# Patient Record
(demographics unavailable — no encounter records)

---

## 2025-02-09 NOTE — HISTORY OF PRESENT ILLNESS
[de-identified] : CC: CRS   HISTORY OF PRESENT ILLNESS: RAHUL FELTON is a 24 year female who presents today with nasal congestion x many years she had an SRP in 2020 following a nasal injury since then she has had persistent nasal congestion that is affecting her QOL, although at baseline she's had allergy symptoms she has been seen by various ENTs and allergists. she has allergies to pollen, animal dander, cockroaches. she takes claritin/aleve periodically. she is using xhance, but she has not tried the netipot. xhance has not improved her symptoms. she had a CT sinuses, but does not recall where she had it done she does endorse bruxism and headaches   Environmental Allergies: yes Immunotherapy: no Smoker: she smokes marijuana daily Asthma: no ASA sensitivity: no History of Autoimmune Disease: no History of Immune Deficiency: no   Key Elements at least 4 described: Location: BL Severity: severe Quality: congestion  Timing: constant  Duration: 4+ years Modifying Factors: AH Associated signs and symptoms: see above   REVIEW OF SYSTEMS: General ROS: negative for - chills, fatigue or fever Psychological ROS: negative for - anxiety or depression Ophthalmic ROS: negative for - blurry vision, decreased vision or double vision ENT ROS: negative except as noted from HPI Allergy and Immunology ROS: negative except as noted from HPI Hematological and Lymphatic ROS: negative for - bleeding problems Endocrine ROS: negative for - malaise/lethargy Respiratory ROS: negative for - stridor Cardiovascular ROS: negative for - chest pain Gastrointestinal ROS: negative for - appetite loss or nausea/vomiting Genitourinary ROS: negative for - incontinence Musculoskeletal ROS: negative for - gait disturbance Neurological ROS: negative for - behavioral changes Dermatological ROS: negative for - nail changes   Physical Exam:   GENERAL APPEARANCE: Well-developed and No Acute Distress. COMMUNICATION: Able to Communicate. Strong Voice.   HEAD AND FACE Eyes: Testing of ocular motility including primary gaze alignment normal. Inspection and Appearance: No evidence of lesions or masses Palpation: Palpation of the face reveals no sinus tenderness Salivary Glands: Symmetric without masses Facial Strength: Symmetric without evidence of facial paralysis   EAR, NOSE, MOUTH, and THROAT: Ear Canals and Tympanic Membranes, Bilateral: No evidence of inflammation or lesions. Thresholds: Clinical speech reception thresholds normal. External, Nose and Auricle: No lesions or masses. Nasal, Mucosa, Septum, and Turbinates: See endoscopy.   NECK: Evaluation: No evidence of masses or crepitus. The neck is symmetric and the trachea is in the midline position. Thyroid: No evidence of enlargement, tenderness or mass. Neck Lymph Nodes: WNL. Respiratory: Inspection of the chest including symmetry, expansion and/or assessment of respiratory effort normal. Cardiovascular: Evaluation of peripheral vascular system by observation and palpation of capillary refill, normal. Neurological/Psychiatric: Alert, Oriented, Mood, and Affect Normal.    PROCEDURE: Removal impacted cerumen requiring instrumentation. (96182)   PREOPERATIVE DIAGNOSIS: Cerumen Impaction   POSTOPERATIVE DIAGNOSIS Dx: Same   INDICATION FOR PROCEDURE: Patient has noted fullness and hearing loss in the affected ears for significant period of time.   EXAM FINDINGS: Auditory canal(s) was obstructed with cerumen. Cerumen was observed with the microscope after the ear speculum was placed in the EAC, and gently loosened with the cerumen loop circumferentially. The cerumen was then removed using suction, cerumen loop, and irrigation. The tympanic membranes are intact following the procedure. Auditory canals appear minimally inflamed.   Left ear: CI removed, TMI Right ear: CI removed, TMI     IMAGING:   PROCEDURE: Nasal endoscopy (39910)   SURGEON: Tj Harley MD   Prior to the procedure, I had a discussion with the patient regarding the risks, benefits, and alternatives of the procedure and a verbal consent was obtained.   After obtaining adequate decongestion of the nasal mucosa with topical Epinephrine and adequate anesthesia with topical Lidocaine nasal spray, the nasal endoscope was used to examine the nasal passages and paranasal sinuses. The endoscope was passed along the floor of the nose bilaterally to evaluate the inferior meatus, floor of the nose, inferior turbinate, and nasopharynx. The scope was then passed superiorly to evaluate the area of the sphenoethmoidal recess, superior turbinate and superior meatus. As the scope was withdrawn anteriorly, the middle turbinate and middle meatus were carefully inspected. The endoscope was withdrawn and the patient tolerated the procedure well. No complications were encountered.   INSTRUMENTS: pediatric 30   EXAM FINDINGS: septum still deviated to the left, unable to visualize the middle meatus, BITH, small polyp on R side. no mucopurlence    IMPRESSION: RAHUL FELTON is a 24 year female who presents today with allergic rhinitis, DNS CRSwNP s/p SRP in 2020   PLAN: - start pred40 taper, prescribed - start NSI w/ budesonide BID, prescribed - she will locate her CT scan and bring it in for evaluation - encouraged use of  for bruxism - RTC in 6 weeks   Tj Harley MD [facility] Rhinology and Skull Base Surgery Department of Otolaryngology- Head and Neck Surgery [facility]

## 2025-02-09 NOTE — HISTORY OF PRESENT ILLNESS
[de-identified] : CC: CRS   HISTORY OF PRESENT ILLNESS: RAHUL FELTON is a 24 year female who presents today with nasal congestion x many years she had an SRP in 2020 following a nasal injury since then she has had persistent nasal congestion that is affecting her QOL, although at baseline she's had allergy symptoms she has been seen by various ENTs and allergists. she has allergies to pollen, animal dander, cockroaches. she takes claritin/aleve periodically. she is using xhance, but she has not tried the netipot. xhance has not improved her symptoms. she had a CT sinuses, but does not recall where she had it done she does endorse bruxism and headaches   Environmental Allergies: yes Immunotherapy: no Smoker: she smokes marijuana daily Asthma: no ASA sensitivity: no History of Autoimmune Disease: no History of Immune Deficiency: no   Key Elements at least 4 described: Location: BL Severity: severe Quality: congestion  Timing: constant  Duration: 4+ years Modifying Factors: AH Associated signs and symptoms: see above   REVIEW OF SYSTEMS: General ROS: negative for - chills, fatigue or fever Psychological ROS: negative for - anxiety or depression Ophthalmic ROS: negative for - blurry vision, decreased vision or double vision ENT ROS: negative except as noted from HPI Allergy and Immunology ROS: negative except as noted from HPI Hematological and Lymphatic ROS: negative for - bleeding problems Endocrine ROS: negative for - malaise/lethargy Respiratory ROS: negative for - stridor Cardiovascular ROS: negative for - chest pain Gastrointestinal ROS: negative for - appetite loss or nausea/vomiting Genitourinary ROS: negative for - incontinence Musculoskeletal ROS: negative for - gait disturbance Neurological ROS: negative for - behavioral changes Dermatological ROS: negative for - nail changes   Physical Exam:   GENERAL APPEARANCE: Well-developed and No Acute Distress. COMMUNICATION: Able to Communicate. Strong Voice.   HEAD AND FACE Eyes: Testing of ocular motility including primary gaze alignment normal. Inspection and Appearance: No evidence of lesions or masses Palpation: Palpation of the face reveals no sinus tenderness Salivary Glands: Symmetric without masses Facial Strength: Symmetric without evidence of facial paralysis   EAR, NOSE, MOUTH, and THROAT: Ear Canals and Tympanic Membranes, Bilateral: No evidence of inflammation or lesions. Thresholds: Clinical speech reception thresholds normal. External, Nose and Auricle: No lesions or masses. Nasal, Mucosa, Septum, and Turbinates: See endoscopy.   NECK: Evaluation: No evidence of masses or crepitus. The neck is symmetric and the trachea is in the midline position. Thyroid: No evidence of enlargement, tenderness or mass. Neck Lymph Nodes: WNL. Respiratory: Inspection of the chest including symmetry, expansion and/or assessment of respiratory effort normal. Cardiovascular: Evaluation of peripheral vascular system by observation and palpation of capillary refill, normal. Neurological/Psychiatric: Alert, Oriented, Mood, and Affect Normal.    PROCEDURE: Removal impacted cerumen requiring instrumentation. ([id number])   PREOPERATIVE DIAGNOSIS: Cerumen Impaction   POSTOPERATIVE DIAGNOSIS Dx: Same   INDICATION FOR PROCEDURE: Patient has noted fullness and hearing loss in the affected ears for significant period of time.   EXAM FINDINGS: Auditory canal(s) was obstructed with cerumen. Cerumen was observed with the microscope after the ear speculum was placed in the EAC, and gently loosened with the cerumen loop circumferentially. The cerumen was then removed using suction, cerumen loop, and irrigation. The tympanic membranes are intact following the procedure. Auditory canals appear minimally inflamed.   Left ear: CI removed, TMI Right ear: CI removed, TMI     IMAGING:   PROCEDURE: Nasal endoscopy ([id number])   SURGEON: jT Harley MD   Prior to the procedure, I had a discussion with the patient regarding the risks, benefits, and alternatives of the procedure and a verbal consent was obtained.   After obtaining adequate decongestion of the nasal mucosa with topical Epinephrine and adequate anesthesia with topical Lidocaine nasal spray, the nasal endoscope was used to examine the nasal passages and paranasal sinuses. The endoscope was passed along the floor of the nose bilaterally to evaluate the inferior meatus, floor of the nose, inferior turbinate, and nasopharynx. The scope was then passed superiorly to evaluate the area of the sphenoethmoidal recess, superior turbinate and superior meatus. As the scope was withdrawn anteriorly, the middle turbinate and middle meatus were carefully inspected. The endoscope was withdrawn and the patient tolerated the procedure well. No complications were encountered.   INSTRUMENTS: pediatric 30   EXAM FINDINGS: septum still deviated to the left, unable to visualize the middle meatus, BITH, small polyp on R side. no mucopurlence    IMPRESSION: RAHUL FELTON is a 24 year female who presents today with allergic rhinitis, DNS CRSwNP s/p SRP in 2020   PLAN: - start pred40 taper, prescribed - start NSI w/ budesonide BID, prescribed - she will locate her CT scan and bring it in for evaluation - encouraged use of  for bruxism - RTC in 6 weeks   Tj Harley MD [facility] Rhinology and Skull Base Surgery Department of Otolaryngology- Head and Neck Surgery [facility]

## 2025-02-09 NOTE — HISTORY OF PRESENT ILLNESS
[de-identified] : CC: CRS   HISTORY OF PRESENT ILLNESS: RAHUL FELTON is a 24 year female who presents today with nasal congestion x many years she had an SRP in 2020 following a nasal injury since then she has had persistent nasal congestion that is affecting her QOL, although at baseline she's had allergy symptoms she has been seen by various ENTs and allergists. she has allergies to pollen, animal dander, cockroaches. she takes claritin/aleve periodically. she is using xhance, but she has not tried the netipot. xhance has not improved her symptoms. she had a CT sinuses, but does not recall where she had it done she does endorse bruxism and headaches   Environmental Allergies: yes Immunotherapy: no Smoker: she smokes marijuana daily Asthma: no ASA sensitivity: no History of Autoimmune Disease: no History of Immune Deficiency: no   Key Elements at least 4 described: Location: BL Severity: severe Quality: congestion  Timing: constant  Duration: 4+ years Modifying Factors: AH Associated signs and symptoms: see above   REVIEW OF SYSTEMS: General ROS: negative for - chills, fatigue or fever Psychological ROS: negative for - anxiety or depression Ophthalmic ROS: negative for - blurry vision, decreased vision or double vision ENT ROS: negative except as noted from HPI Allergy and Immunology ROS: negative except as noted from HPI Hematological and Lymphatic ROS: negative for - bleeding problems Endocrine ROS: negative for - malaise/lethargy Respiratory ROS: negative for - stridor Cardiovascular ROS: negative for - chest pain Gastrointestinal ROS: negative for - appetite loss or nausea/vomiting Genitourinary ROS: negative for - incontinence Musculoskeletal ROS: negative for - gait disturbance Neurological ROS: negative for - behavioral changes Dermatological ROS: negative for - nail changes   Physical Exam:   GENERAL APPEARANCE: Well-developed and No Acute Distress. COMMUNICATION: Able to Communicate. Strong Voice.   HEAD AND FACE Eyes: Testing of ocular motility including primary gaze alignment normal. Inspection and Appearance: No evidence of lesions or masses Palpation: Palpation of the face reveals no sinus tenderness Salivary Glands: Symmetric without masses Facial Strength: Symmetric without evidence of facial paralysis   EAR, NOSE, MOUTH, and THROAT: Ear Canals and Tympanic Membranes, Bilateral: No evidence of inflammation or lesions. Thresholds: Clinical speech reception thresholds normal. External, Nose and Auricle: No lesions or masses. Nasal, Mucosa, Septum, and Turbinates: See endoscopy.   NECK: Evaluation: No evidence of masses or crepitus. The neck is symmetric and the trachea is in the midline position. Thyroid: No evidence of enlargement, tenderness or mass. Neck Lymph Nodes: WNL. Respiratory: Inspection of the chest including symmetry, expansion and/or assessment of respiratory effort normal. Cardiovascular: Evaluation of peripheral vascular system by observation and palpation of capillary refill, normal. Neurological/Psychiatric: Alert, Oriented, Mood, and Affect Normal.    PROCEDURE: Removal impacted cerumen requiring instrumentation. ([id number])   PREOPERATIVE DIAGNOSIS: Cerumen Impaction   POSTOPERATIVE DIAGNOSIS Dx: Same   INDICATION FOR PROCEDURE: Patient has noted fullness and hearing loss in the affected ears for significant period of time.   EXAM FINDINGS: Auditory canal(s) was obstructed with cerumen. Cerumen was observed with the microscope after the ear speculum was placed in the EAC, and gently loosened with the cerumen loop circumferentially. The cerumen was then removed using suction, cerumen loop, and irrigation. The tympanic membranes are intact following the procedure. Auditory canals appear minimally inflamed.   Left ear: CI removed, TMI Right ear: CI removed, TMI     IMAGING:   PROCEDURE: Nasal endoscopy ([id number])   SURGEON: Tj Harley MD   Prior to the procedure, I had a discussion with the patient regarding the risks, benefits, and alternatives of the procedure and a verbal consent was obtained.   After obtaining adequate decongestion of the nasal mucosa with topical Epinephrine and adequate anesthesia with topical Lidocaine nasal spray, the nasal endoscope was used to examine the nasal passages and paranasal sinuses. The endoscope was passed along the floor of the nose bilaterally to evaluate the inferior meatus, floor of the nose, inferior turbinate, and nasopharynx. The scope was then passed superiorly to evaluate the area of the sphenoethmoidal recess, superior turbinate and superior meatus. As the scope was withdrawn anteriorly, the middle turbinate and middle meatus were carefully inspected. The endoscope was withdrawn and the patient tolerated the procedure well. No complications were encountered.   INSTRUMENTS: pediatric 30   EXAM FINDINGS: septum still deviated to the left, unable to visualize the middle meatus, BITH, small polyp on R side. no mucopurlence    IMPRESSION: RAHUL FELTON is a 24 year female who presents today with allergic rhinitis, DNS CRSwNP s/p SRP in 2020   PLAN: - start pred40 taper, prescribed - start NSI w/ budesonide BID, prescribed - she will locate her CT scan and bring it in for evaluation - encouraged use of  for bruxism - RTC in 6 weeks   Tj Harley MD [facility] Rhinology and Skull Base Surgery Department of Otolaryngology- Head and Neck Surgery [facility]

## 2025-04-03 NOTE — HISTORY OF PRESENT ILLNESS
[de-identified] : CC: CRS   HISTORY OF PRESENT ILLNESS: RAHUL FELTON is a 24 year female who presents today with nasal congestion x many years she had an SRP in 2020 following a nasal injury since then she has had persistent nasal congestion that is affecting her QOL, although at baseline she's had allergy symptoms she has been seen by various ENTs and allergists. she has allergies to pollen, animal dander, cockroaches. she takes claritin/aleve periodically. she is using xhance, but she has not tried the netipot. xhance has not improved her symptoms. she had a CT sinuses, but does not recall where she had it done she does endorse bruxism and headaches   Environmental Allergies: yes Immunotherapy: no Smoker: she smokes marijuana daily Asthma: no ASA sensitivity: no History of Autoimmune Disease: no History of Immune Deficiency: no   Key Elements at least 4 described: Location: BL Severity: severe Quality: congestion  Timing: constant  Duration: 4+ years Modifying Factors: AH Associated signs and symptoms: see above   6 week f/u s/p pred 40 taper/budesonide   REVIEW OF SYSTEMS: General ROS: negative for - chills, fatigue or fever Psychological ROS: negative for - anxiety or depression Ophthalmic ROS: negative for - blurry vision, decreased vision or double vision ENT ROS: negative except as noted from HPI Allergy and Immunology ROS: negative except as noted from HPI Hematological and Lymphatic ROS: negative for - bleeding problems Endocrine ROS: negative for - malaise/lethargy Respiratory ROS: negative for - stridor Cardiovascular ROS: negative for - chest pain Gastrointestinal ROS: negative for - appetite loss or nausea/vomiting Genitourinary ROS: negative for - incontinence Musculoskeletal ROS: negative for - gait disturbance Neurological ROS: negative for - behavioral changes Dermatological ROS: negative for - nail changes   Physical Exam:   GENERAL APPEARANCE: Well-developed and No Acute Distress. COMMUNICATION: Able to Communicate. Strong Voice.   HEAD AND FACE Eyes: Testing of ocular motility including primary gaze alignment normal. Inspection and Appearance: No evidence of lesions or masses Palpation: Palpation of the face reveals no sinus tenderness Salivary Glands: Symmetric without masses Facial Strength: Symmetric without evidence of facial paralysis   EAR, NOSE, MOUTH, and THROAT: Ear Canals and Tympanic Membranes, Bilateral: No evidence of inflammation or lesions. Thresholds: Clinical speech reception thresholds normal. External, Nose and Auricle: No lesions or masses. Nasal, Mucosa, Septum, and Turbinates: See endoscopy.   NECK: Evaluation: No evidence of masses or crepitus. The neck is symmetric and the trachea is in the midline position. Thyroid: No evidence of enlargement, tenderness or mass. Neck Lymph Nodes: WNL. Respiratory: Inspection of the chest including symmetry, expansion and/or assessment of respiratory effort normal. Cardiovascular: Evaluation of peripheral vascular system by observation and palpation of capillary refill, normal. Neurological/Psychiatric: Alert, Oriented, Mood, and Affect Normal.    PROCEDURE: Removal impacted cerumen requiring instrumentation. ([id number])   PREOPERATIVE DIAGNOSIS: Cerumen Impaction   POSTOPERATIVE DIAGNOSIS Dx: Same   INDICATION FOR PROCEDURE: Patient has noted fullness and hearing loss in the affected ears for significant period of time.   EXAM FINDINGS: Auditory canal(s) was obstructed with cerumen. Cerumen was observed with the microscope after the ear speculum was placed in the EAC, and gently loosened with the cerumen loop circumferentially. The cerumen was then removed using suction, cerumen loop, and irrigation. The tympanic membranes are intact following the procedure. Auditory canals appear minimally inflamed.   Left ear: CI removed, TMI Right ear: CI removed, TMI     IMAGING:   PROCEDURE: Nasal endoscopy ([id number])   SURGEON: Tj Harley MD   Prior to the procedure, I had a discussion with the patient regarding the risks, benefits, and alternatives of the procedure and a verbal consent was obtained.   After obtaining adequate decongestion of the nasal mucosa with topical Epinephrine and adequate anesthesia with topical Lidocaine nasal spray, the nasal endoscope was used to examine the nasal passages and paranasal sinuses. The endoscope was passed along the floor of the nose bilaterally to evaluate the inferior meatus, floor of the nose, inferior turbinate, and nasopharynx. The scope was then passed superiorly to evaluate the area of the sphenoethmoidal recess, superior turbinate and superior meatus. As the scope was withdrawn anteriorly, the middle turbinate and middle meatus were carefully inspected. The endoscope was withdrawn and the patient tolerated the procedure well. No complications were encountered.   INSTRUMENTS: pediatric 30   EXAM FINDINGS: septum still deviated to the left, unable to visualize the middle meatus, BITH, small polyp on R side. no mucopurlence    IMPRESSION: RAHUL FELTON is a 24 year female who presents today with allergic rhinitis, DNS CRSwNP s/p SRP in 2020   PLAN: - start pred40 taper, prescribed - start NSI w/ budesonide BID, prescribed - she will locate her CT scan and bring it in for evaluation - encouraged use of  for bruxism - RTC in 6 weeks   Tj Harley MD [facility] Rhinology and Skull Base Surgery Department of Otolaryngology- Head and Neck Surgery [facility]
[de-identified] : CC: CRS   HISTORY OF PRESENT ILLNESS: RAHUL FELTON is a 24 year female who presents today with nasal congestion x many years she had an SRP in 2020 following a nasal injury since then she has had persistent nasal congestion that is affecting her QOL, although at baseline she's had allergy symptoms she has been seen by various ENTs and allergists. she has allergies to pollen, animal dander, cockroaches. she takes claritin/aleve periodically. she is using xhance, but she has not tried the netipot. xhance has not improved her symptoms. she had a CT sinuses, but does not recall where she had it done she does endorse bruxism and headaches   Environmental Allergies: yes Immunotherapy: no Smoker: she smokes marijuana daily Asthma: no ASA sensitivity: no History of Autoimmune Disease: no History of Immune Deficiency: no   Key Elements at least 4 described: Location: BL Severity: severe Quality: congestion  Timing: constant  Duration: 4+ years Modifying Factors: AH Associated signs and symptoms: see above   6 week f/u s/p pred 40 taper/budesonide   REVIEW OF SYSTEMS: General ROS: negative for - chills, fatigue or fever Psychological ROS: negative for - anxiety or depression Ophthalmic ROS: negative for - blurry vision, decreased vision or double vision ENT ROS: negative except as noted from HPI Allergy and Immunology ROS: negative except as noted from HPI Hematological and Lymphatic ROS: negative for - bleeding problems Endocrine ROS: negative for - malaise/lethargy Respiratory ROS: negative for - stridor Cardiovascular ROS: negative for - chest pain Gastrointestinal ROS: negative for - appetite loss or nausea/vomiting Genitourinary ROS: negative for - incontinence Musculoskeletal ROS: negative for - gait disturbance Neurological ROS: negative for - behavioral changes Dermatological ROS: negative for - nail changes   Physical Exam:   GENERAL APPEARANCE: Well-developed and No Acute Distress. COMMUNICATION: Able to Communicate. Strong Voice.   HEAD AND FACE Eyes: Testing of ocular motility including primary gaze alignment normal. Inspection and Appearance: No evidence of lesions or masses Palpation: Palpation of the face reveals no sinus tenderness Salivary Glands: Symmetric without masses Facial Strength: Symmetric without evidence of facial paralysis   EAR, NOSE, MOUTH, and THROAT: Ear Canals and Tympanic Membranes, Bilateral: No evidence of inflammation or lesions. Thresholds: Clinical speech reception thresholds normal. External, Nose and Auricle: No lesions or masses. Nasal, Mucosa, Septum, and Turbinates: See endoscopy.   NECK: Evaluation: No evidence of masses or crepitus. The neck is symmetric and the trachea is in the midline position. Thyroid: No evidence of enlargement, tenderness or mass. Neck Lymph Nodes: WNL. Respiratory: Inspection of the chest including symmetry, expansion and/or assessment of respiratory effort normal. Cardiovascular: Evaluation of peripheral vascular system by observation and palpation of capillary refill, normal. Neurological/Psychiatric: Alert, Oriented, Mood, and Affect Normal.    PROCEDURE: Removal impacted cerumen requiring instrumentation. ([id number])   PREOPERATIVE DIAGNOSIS: Cerumen Impaction   POSTOPERATIVE DIAGNOSIS Dx: Same   INDICATION FOR PROCEDURE: Patient has noted fullness and hearing loss in the affected ears for significant period of time.   EXAM FINDINGS: Auditory canal(s) was obstructed with cerumen. Cerumen was observed with the microscope after the ear speculum was placed in the EAC, and gently loosened with the cerumen loop circumferentially. The cerumen was then removed using suction, cerumen loop, and irrigation. The tympanic membranes are intact following the procedure. Auditory canals appear minimally inflamed.   Left ear: CI removed, TMI Right ear: CI removed, TMI     IMAGING:   PROCEDURE: Nasal endoscopy ([id number])   SURGEON: Tj Harley MD   Prior to the procedure, I had a discussion with the patient regarding the risks, benefits, and alternatives of the procedure and a verbal consent was obtained.   After obtaining adequate decongestion of the nasal mucosa with topical Epinephrine and adequate anesthesia with topical Lidocaine nasal spray, the nasal endoscope was used to examine the nasal passages and paranasal sinuses. The endoscope was passed along the floor of the nose bilaterally to evaluate the inferior meatus, floor of the nose, inferior turbinate, and nasopharynx. The scope was then passed superiorly to evaluate the area of the sphenoethmoidal recess, superior turbinate and superior meatus. As the scope was withdrawn anteriorly, the middle turbinate and middle meatus were carefully inspected. The endoscope was withdrawn and the patient tolerated the procedure well. No complications were encountered.   INSTRUMENTS: pediatric 30   EXAM FINDINGS: septum still deviated to the left, unable to visualize the middle meatus, BITH, small polyp on R side. no mucopurlence    IMPRESSION: RAHUL FELTON is a 24 year female who presents today with allergic rhinitis, DNS CRSwNP s/p SRP in 2020   PLAN: - start pred40 taper, prescribed - start NSI w/ budesonide BID, prescribed - she will locate her CT scan and bring it in for evaluation - encouraged use of  for bruxism - RTC in 6 weeks   Tj Harley MD [facility] Rhinology and Skull Base Surgery Department of Otolaryngology- Head and Neck Surgery [facility]
Detail Level: Detailed
Detail Level: Zone

## 2025-04-28 NOTE — HISTORY OF PRESENT ILLNESS
[de-identified] : CC: CRS   HISTORY OF PRESENT ILLNESS: RAHUL FELTON is a 24 year female who presents today with nasal congestion x many years she had an SRP in 2020 following a nasal injury since then she has had persistent nasal congestion that is affecting her QOL, although at baseline she's had allergy symptoms she has been seen by various ENTs and allergists. she has allergies to pollen, animal dander, cockroaches. she takes claritin/aleve periodically. she is using xhance, but she has not tried the netipot. xhance has not improved her symptoms. she had a CT sinuses, but does not recall where she had it done she does endorse bruxism and headaches   Environmental Allergies: yes Immunotherapy: no Smoker: she smokes marijuana daily Asthma: no ASA sensitivity: no History of Autoimmune Disease: no History of Immune Deficiency: no   Key Elements at least 4 described: Location: BL Severity: severe Quality: congestion  Timing: constant  Duration: 4+ years Modifying Factors: AH Associated signs and symptoms: see above   6 week f/u s/p pred 40 taper/budesonide very minimal improvement  s/p CT sinus significant left DNS, very minimal sinus disease.   REVIEW OF SYSTEMS: General ROS: negative for - chills, fatigue or fever Psychological ROS: negative for - anxiety or depression Ophthalmic ROS: negative for - blurry vision, decreased vision or double vision ENT ROS: negative except as noted from HPI Allergy and Immunology ROS: negative except as noted from HPI Hematological and Lymphatic ROS: negative for - bleeding problems Endocrine ROS: negative for - malaise/lethargy Respiratory ROS: negative for - stridor Cardiovascular ROS: negative for - chest pain Gastrointestinal ROS: negative for - appetite loss or nausea/vomiting Genitourinary ROS: negative for - incontinence Musculoskeletal ROS: negative for - gait disturbance Neurological ROS: negative for - behavioral changes Dermatological ROS: negative for - nail changes   IMPRESSION: RAHUL FELTON is a 24 year female who presents today with allergic rhinitis, DNS CRSwNP s/p SRP in 2020   PLAN: - continue budesonide BID - she would benefit from revision SRP, she would like to return to review images   Tj Harley MD [facility] Rhinology and Skull Base Surgery Department of Otolaryngology- Head and Neck Surgery [facility]

## 2025-05-13 NOTE — HISTORY OF PRESENT ILLNESS
[de-identified] : CC: CRS   HISTORY OF PRESENT ILLNESS: RAHUL FELTON is a 24 year female who presents today with nasal congestion x many years she had an SRP in 2020 following a nasal injury since then she has had persistent nasal congestion that is affecting her QOL, although at baseline she's had allergy symptoms she has been seen by various ENTs and allergists. she has allergies to pollen, animal dander, cockroaches. she takes claritin/aleve periodically. she is using xhance, but she has not tried the netipot. xhance has not improved her symptoms. she had a CT sinuses, but does not recall where she had it done she does endorse bruxism and headaches   Environmental Allergies: yes Immunotherapy: no Smoker: she smokes marijuana daily Asthma: no ASA sensitivity: no History of Autoimmune Disease: no History of Immune Deficiency: no   Key Elements at least 4 described: Location: BL Severity: severe Quality: congestion  Timing: constant  Duration: 4+ years Modifying Factors: AH Associated signs and symptoms: see above   6 week f/u s/p pred 40 taper/budesonide very minimal improvement  s/p CT sinus significant left DNS, very minimal sinus disease.   3 week f/u here to review CT images with mother she is not irrigating at the moment  REVIEW OF SYSTEMS: General ROS: negative for - chills, fatigue or fever Psychological ROS: negative for - anxiety or depression Ophthalmic ROS: negative for - blurry vision, decreased vision or double vision ENT ROS: negative except as noted from HPI Allergy and Immunology ROS: negative except as noted from HPI Hematological and Lymphatic ROS: negative for - bleeding problems Endocrine ROS: negative for - malaise/lethargy Respiratory ROS: negative for - stridor Cardiovascular ROS: negative for - chest pain Gastrointestinal ROS: negative for - appetite loss or nausea/vomiting Genitourinary ROS: negative for - incontinence Musculoskeletal ROS: negative for - gait disturbance Neurological ROS: negative for - behavioral changes Dermatological ROS: negative for - nail changes  Physical Exam:  GENERAL APPEARANCE: Well-developed and No Acute Distress. COMMUNICATION: Able to Communicate. Strong Voice.  HEAD AND FACE Eyes: Testing of ocular motility including primary gaze alignment normal. Inspection and Appearance: No evidence of lesions or masses Palpation: Palpation of the face reveals no sinus tenderness Salivary Glands: Symmetric without masses Facial Strength: Symmetric without evidence of facial paralysis  EAR, NOSE, MOUTH, and THROAT: Ear Canals and Tympanic Membranes, Bilateral: No evidence of inflammation or lesions. Thresholds: Clinical speech reception thresholds normal. External, Nose and Auricle: No lesions or masses. Nasal, Mucosa, Septum, and Turbinates: See endoscopy.  NECK: Evaluation: No evidence of masses or crepitus. The neck is symmetric and the trachea is in the midline position. Thyroid: No evidence of enlargement, tenderness or mass. Neck Lymph Nodes: WNL. Respiratory: Inspection of the chest including symmetry, expansion and/or assessment of respiratory effort normal. Cardiovascular: Evaluation of peripheral vascular system by observation and palpation of capillary refill, normal. Neurological/Psychiatric: Alert, Oriented, Mood, and Affect Normal.  IMAGING:  previous visit PROCEDURE: Nasal endoscopy ([id number])  SURGEON: Tj Harley MD  Prior to the procedure, I had a discussion with the patient regarding the risks, benefits, and alternatives of the procedure and a verbal consent was obtained.  After obtaining adequate decongestion of the nasal mucosa with topical Epinephrine and adequate anesthesia with topical Lidocaine nasal spray, the nasal endoscope was used to examine the nasal passages and paranasal sinuses. The endoscope was passed along the floor of the nose bilaterally to evaluate the inferior meatus, floor of the nose, inferior turbinate, and nasopharynx. The scope was then passed superiorly to evaluate the area of the sphenoethmoidal recess, superior turbinate and superior meatus. As the scope was withdrawn anteriorly, the middle turbinate and middle meatus were carefully inspected. The endoscope was withdrawn and the patient tolerated the procedure well. No complications were encountered.  INSTRUMENTS: pediatric 30  EXAM FINDINGS: septum still deviated to the left, unable to visualize the middle meatus, BITH, small polyp on R side. no mucopurlence. unchanged   IMPRESSION: RAHUL FELTON is a 24 year female who presents today with allergic rhinitis, DNS CRSwNP s/p SRP in 2020   PLAN: - continue budesonide BID - she would benefit from revision SRP, referred to see Dr. Mello -I do not see a role for FESS at this time, she understands   Tj Harley MD [facility] Rhinology and Skull Base Surgery Department of Otolaryngology- Head and Neck Surgery [facility]

## 2025-05-13 NOTE — HISTORY OF PRESENT ILLNESS
[de-identified] : CC: CRS   HISTORY OF PRESENT ILLNESS: RAHUL FELTON is a 24 year female who presents today with nasal congestion x many years she had an SRP in 2020 following a nasal injury since then she has had persistent nasal congestion that is affecting her QOL, although at baseline she's had allergy symptoms she has been seen by various ENTs and allergists. she has allergies to pollen, animal dander, cockroaches. she takes claritin/aleve periodically. she is using xhance, but she has not tried the netipot. xhance has not improved her symptoms. she had a CT sinuses, but does not recall where she had it done she does endorse bruxism and headaches   Environmental Allergies: yes Immunotherapy: no Smoker: she smokes marijuana daily Asthma: no ASA sensitivity: no History of Autoimmune Disease: no History of Immune Deficiency: no   Key Elements at least 4 described: Location: BL Severity: severe Quality: congestion  Timing: constant  Duration: 4+ years Modifying Factors: AH Associated signs and symptoms: see above   6 week f/u s/p pred 40 taper/budesonide very minimal improvement  s/p CT sinus significant left DNS, very minimal sinus disease.   3 week f/u here to review CT images with mother she is not irrigating at the moment  REVIEW OF SYSTEMS: General ROS: negative for - chills, fatigue or fever Psychological ROS: negative for - anxiety or depression Ophthalmic ROS: negative for - blurry vision, decreased vision or double vision ENT ROS: negative except as noted from HPI Allergy and Immunology ROS: negative except as noted from HPI Hematological and Lymphatic ROS: negative for - bleeding problems Endocrine ROS: negative for - malaise/lethargy Respiratory ROS: negative for - stridor Cardiovascular ROS: negative for - chest pain Gastrointestinal ROS: negative for - appetite loss or nausea/vomiting Genitourinary ROS: negative for - incontinence Musculoskeletal ROS: negative for - gait disturbance Neurological ROS: negative for - behavioral changes Dermatological ROS: negative for - nail changes  Physical Exam:  GENERAL APPEARANCE: Well-developed and No Acute Distress. COMMUNICATION: Able to Communicate. Strong Voice.  HEAD AND FACE Eyes: Testing of ocular motility including primary gaze alignment normal. Inspection and Appearance: No evidence of lesions or masses Palpation: Palpation of the face reveals no sinus tenderness Salivary Glands: Symmetric without masses Facial Strength: Symmetric without evidence of facial paralysis  EAR, NOSE, MOUTH, and THROAT: Ear Canals and Tympanic Membranes, Bilateral: No evidence of inflammation or lesions. Thresholds: Clinical speech reception thresholds normal. External, Nose and Auricle: No lesions or masses. Nasal, Mucosa, Septum, and Turbinates: See endoscopy.  NECK: Evaluation: No evidence of masses or crepitus. The neck is symmetric and the trachea is in the midline position. Thyroid: No evidence of enlargement, tenderness or mass. Neck Lymph Nodes: WNL. Respiratory: Inspection of the chest including symmetry, expansion and/or assessment of respiratory effort normal. Cardiovascular: Evaluation of peripheral vascular system by observation and palpation of capillary refill, normal. Neurological/Psychiatric: Alert, Oriented, Mood, and Affect Normal.  IMAGING:  previous visit PROCEDURE: Nasal endoscopy ([id number])  SURGEON: Tj Harley MD  Prior to the procedure, I had a discussion with the patient regarding the risks, benefits, and alternatives of the procedure and a verbal consent was obtained.  After obtaining adequate decongestion of the nasal mucosa with topical Epinephrine and adequate anesthesia with topical Lidocaine nasal spray, the nasal endoscope was used to examine the nasal passages and paranasal sinuses. The endoscope was passed along the floor of the nose bilaterally to evaluate the inferior meatus, floor of the nose, inferior turbinate, and nasopharynx. The scope was then passed superiorly to evaluate the area of the sphenoethmoidal recess, superior turbinate and superior meatus. As the scope was withdrawn anteriorly, the middle turbinate and middle meatus were carefully inspected. The endoscope was withdrawn and the patient tolerated the procedure well. No complications were encountered.  INSTRUMENTS: pediatric 30  EXAM FINDINGS: septum still deviated to the left, unable to visualize the middle meatus, BITH, small polyp on R side. no mucopurlence. unchanged   IMPRESSION: RAHUL FELTON is a 24 year female who presents today with allergic rhinitis, DNS CRSwNP s/p SRP in 2020   PLAN: - continue budesonide BID - she would benefit from revision SRP, referred to see Dr. Mello -I do not see a role for FESS at this time, she understands   Tj Harley MD [facility] Rhinology and Skull Base Surgery Department of Otolaryngology- Head and Neck Surgery [facility]